# Patient Record
Sex: MALE | Race: WHITE | Employment: FULL TIME | URBAN - METROPOLITAN AREA
[De-identification: names, ages, dates, MRNs, and addresses within clinical notes are randomized per-mention and may not be internally consistent; named-entity substitution may affect disease eponyms.]

---

## 2017-01-15 ENCOUNTER — OFFICE VISIT (OUTPATIENT)
Dept: FAMILY MEDICINE CLINIC | Facility: CLINIC | Age: 32
End: 2017-01-15

## 2017-01-15 VITALS
RESPIRATION RATE: 16 BRPM | TEMPERATURE: 98 F | HEART RATE: 110 BPM | OXYGEN SATURATION: 99 % | DIASTOLIC BLOOD PRESSURE: 90 MMHG | SYSTOLIC BLOOD PRESSURE: 130 MMHG

## 2017-01-15 DIAGNOSIS — L50.9 HIVES OF UNKNOWN ORIGIN: Primary | ICD-10-CM

## 2017-01-15 PROCEDURE — 99203 OFFICE O/P NEW LOW 30 MIN: CPT | Performed by: NURSE PRACTITIONER

## 2017-01-15 RX ORDER — FLUTICASONE PROPIONATE 0.05 %
1 CREAM (GRAM) TOPICAL 2 TIMES DAILY
Qty: 30 G | Refills: 0 | Status: SHIPPED | OUTPATIENT
Start: 2017-01-15 | End: 2017-10-25

## 2017-01-15 RX ORDER — PREDNISONE 20 MG/1
40 TABLET ORAL DAILY
Qty: 10 TABLET | Refills: 0 | Status: SHIPPED | OUTPATIENT
Start: 2017-01-15 | End: 2017-01-20

## 2017-01-15 RX ORDER — HYDROXYZINE HYDROCHLORIDE 10 MG/1
10 TABLET, FILM COATED ORAL 3 TIMES DAILY PRN
Qty: 30 TABLET | Refills: 0 | Status: SHIPPED | OUTPATIENT
Start: 2017-01-15 | End: 2017-10-25

## 2017-01-15 NOTE — PROGRESS NOTES
CHIEF COMPLAINT:   Patient presents with:  Derm Problem: all over. started friday night. had a cold last week. took claritin Thursday and Friday afternoon. got rash friday night. rash itches. neck, face, feet, chest,back shoulders. worse than yesterday. LUNGS: Denies shortness of breath with exertion or rest. No cough or wheezing. LYMPH: Denies enlargement of the lymph nodes. NEURO: Denies abnormal sensation, tingling of the skin, or numbness. EXAM:   /90 mmHg  Pulse 110  Temp(Src) 98 °F (36. Risk and benefits of medication discussed. Patient Instructions     Drug Reaction: Allergic  You are having an allergic reaction to a drug you have taken. This causes an itchy rash and sometimes swelling of various parts of the body.  It may also caus The goal of treatment is to help relieve the symptoms, and get you feeling better. Mild to moderate drug reactions usually respond quickly to antihistamines and steroids.  The rash will usually fade over several days, but can sometimes last a couple of week Follow up with your healthcare provider, or as advised if your symptoms do not continue to improve or they get worse.   Call 911  Call 911 if any of these occur:  · Trouble breathing or swallowing, wheezing  · New or worsening swelling in the mouth, throat, Hives are often caused by an allergic reaction. It may be an allergic reaction to foods such as fruit, shellfish, chocolate, nuts, or tomatoes. It may be a reaction to pollens, animal fur, or mold spores.  Medicines, chemicals, and insect bites can also cau Follow-up care  Follow up with your healthcare provider if your symptoms don't get better in 2 days. Ask your provider about allergy testing if you have had a severe reaction, or have had several episodes of hives.  He or she can use the allergy testing to

## 2017-01-15 NOTE — PATIENT INSTRUCTIONS
Drug Reaction: Allergic  You are having an allergic reaction to a drug you have taken. This causes an itchy rash and sometimes swelling of various parts of the body. It may also cause trouble swallowing or breathing.  The rash may take a few hours or up t The goal of treatment is to help relieve the symptoms, and get you feeling better. Mild to moderate drug reactions usually respond quickly to antihistamines and steroids.  The rash will usually fade over several days, but can sometimes last a couple of week Follow up with your healthcare provider, or as advised if your symptoms do not continue to improve or they get worse.   Call 911  Call 911 if any of these occur:  · Trouble breathing or swallowing, wheezing  · New or worsening swelling in the mouth, throat, Hives are often caused by an allergic reaction. It may be an allergic reaction to foods such as fruit, shellfish, chocolate, nuts, or tomatoes. It may be a reaction to pollens, animal fur, or mold spores.  Medicines, chemicals, and insect bites can also cau Follow-up care  Follow up with your healthcare provider if your symptoms don't get better in 2 days. Ask your provider about allergy testing if you have had a severe reaction, or have had several episodes of hives.  He or she can use the allergy testing to

## 2017-11-19 ENCOUNTER — OFFICE VISIT (OUTPATIENT)
Dept: FAMILY MEDICINE CLINIC | Facility: CLINIC | Age: 32
End: 2017-11-19

## 2017-11-19 VITALS
RESPIRATION RATE: 16 BRPM | SYSTOLIC BLOOD PRESSURE: 122 MMHG | OXYGEN SATURATION: 99 % | TEMPERATURE: 98 F | HEART RATE: 81 BPM | DIASTOLIC BLOOD PRESSURE: 74 MMHG

## 2017-11-19 DIAGNOSIS — L30.9 DERMATITIS: Primary | ICD-10-CM

## 2017-11-19 DIAGNOSIS — L73.9 FOLLICULITIS: ICD-10-CM

## 2017-11-19 PROCEDURE — 99213 OFFICE O/P EST LOW 20 MIN: CPT | Performed by: PHYSICIAN ASSISTANT

## 2017-11-19 RX ORDER — PREDNISONE 20 MG/1
20 TABLET ORAL DAILY
Qty: 5 TABLET | Refills: 0 | Status: SHIPPED | OUTPATIENT
Start: 2017-11-19 | End: 2017-11-24

## 2017-11-19 RX ORDER — HYDROXYZINE HYDROCHLORIDE 10 MG/1
10 TABLET, FILM COATED ORAL 3 TIMES DAILY PRN
Qty: 20 TABLET | Refills: 0 | Status: SHIPPED | OUTPATIENT
Start: 2017-11-19 | End: 2021-02-08 | Stop reason: ALTCHOICE

## 2017-11-19 NOTE — PROGRESS NOTES
CHIEF COMPLAINT:   Patient presents with:  Derm Problem: 2 months intermittently, seems to be worse in the morning, no known triggers      HPI:   Kemi Sharif is a 29year old male who presents for evaluation of a skin rash .  Patient states  started abou lesions.   NECK: supple, non-tender  LUNGS: non labored breathing,  clear to auscultation bilaterally, no wheezing, rales or rhonchi  CARDIO: RRR without murmur  EXTREMITIES: no cyanosis, clubbing or edema  LYMPH:  no cervical, submandibular, or supraclavic

## 2017-11-19 NOTE — PATIENT INSTRUCTIONS
Folliculitis  Folliculitis is an inflammation of a hair follicle. A hair follicle is the little pocket where a hair grows out of the skin. Bacteria normally live on the skin. But sometimes bacteria can get trapped in a follicle and cause infection.  This · Change sheets and blankets if they are soiled by pus. Wash all clothes, towels, sheets, and cloth diapers in soap and hot water. Do not share clothes, towels, or sheets with other family members. · Do not soak the sores in bath water.  This can spread in · Dry skin, which is often seen during the winter months and in older people  How can I control itching and skin damage? · Take soothing lukewarm baths in a colloidal oatmeal product. You can buy this at the drugstore.   · Do your best not to scratch. Clip · Rash covers your face, genitals, or most of your body  · Crusty sores or red rings that begin to spread  · You were exposed to someone who has a contagious rash, such as scabies or lice.   · Red bull’s-eye rash with a white center (a sign of Lyme disease)

## (undated) NOTE — MR AVS SNAPSHOT
[id number] [facility] [address]  [place] [id number]  [phone]               Thank you for choosing us for your health care visit with Pasha Dejesus NP. We are glad to serve you and happy to provide you with this summary of your visit. · Fever.  Sometimes fever is the only symptom of a drug reaction.  In elderly people, the risk of fever increases with the number of drugs the older person takes.   More severe symptoms include:  · Trouble swallowing, feeling like your throat is closing  · you have glaucoma or if you are a man with trouble urinating due to an enlarged prostate.]  There are other antihistamines that cause less drowsiness and are a good alternative for daytime use. Ask your pharmacist for suggestions.   · Do not use Benadryl cr 130/90 mmHg 110 98 °F (36.7 °C) (Oral)            Current Medications          This list is accurate as of: 1/15/17 10:52 AM.  Always use your most recent med list.                Fluticasone Propionate 0.05 % Crea   Apply 1 Application topically 2 (two) return for a follow-up Blood Pressure Check in 1 month.      Lifestyle Modification Recommendations:    Modification Recommendation   Weight Reduction Maintain normal body weight (body mass index 18.5-24.9 kg/m2)   DASH eating plan Adopt a diet rich in frui 2 ½ hours per week – spread out over time Use a eliot to keep you motivated   Don’t forget strength training with weights and resistance Set goals and track your progress   You don’t need to join a gym. Home exercises work great.  Put more priority on exe